# Patient Record
Sex: FEMALE | NOT HISPANIC OR LATINO | Employment: FULL TIME | URBAN - METROPOLITAN AREA
[De-identification: names, ages, dates, MRNs, and addresses within clinical notes are randomized per-mention and may not be internally consistent; named-entity substitution may affect disease eponyms.]

---

## 2019-03-11 ENCOUNTER — OFFICE VISIT CONVERTED (OUTPATIENT)
Dept: PLASTIC SURGERY | Facility: CLINIC | Age: 29
End: 2019-03-11
Attending: PLASTIC SURGERY

## 2019-04-04 ENCOUNTER — PROCEDURE VISIT CONVERTED (OUTPATIENT)
Dept: PLASTIC SURGERY | Facility: CLINIC | Age: 29
End: 2019-04-04
Attending: PLASTIC SURGERY

## 2019-04-04 ENCOUNTER — HOSPITAL ENCOUNTER (OUTPATIENT)
Dept: OTHER | Facility: HOSPITAL | Age: 29
Discharge: HOME | End: 2019-04-04
Attending: PLASTIC SURGERY

## 2019-04-11 ENCOUNTER — CONVERSION ENCOUNTER (OUTPATIENT)
Dept: PLASTIC SURGERY | Facility: CLINIC | Age: 29
End: 2019-04-11
Attending: SURGERY

## 2020-11-02 ENCOUNTER — HOSPITAL ENCOUNTER (OUTPATIENT)
Dept: ORTHOPEDIC SURGERY | Facility: CLINIC | Age: 30
Setting detail: RECURRING SERIES
Discharge: HOME | End: 2020-12-02
Attending: SURGERY

## 2021-03-04 ENCOUNTER — HOSPITAL ENCOUNTER (OUTPATIENT)
Dept: CARDIOLOGY | Facility: HOSPITAL | Age: 31
Discharge: HOME | End: 2021-03-04
Attending: NURSE PRACTITIONER

## 2021-03-26 ENCOUNTER — HOSPITAL ENCOUNTER (OUTPATIENT)
Dept: LAB | Facility: HOSPITAL | Age: 31
Discharge: HOME | End: 2021-03-26
Attending: INTERNAL MEDICINE

## 2021-03-26 LAB
ALBUMIN SERPL-MCNC: 4.5 G/DL (ref 3.5–5)
ALBUMIN/GLOB SERPL: 2 {RATIO} (ref 1.4–2.6)
ALP SERPL-CCNC: 90 U/L (ref 42–98)
ALT SERPL-CCNC: 29 U/L (ref 10–40)
ANION GAP SERPL CALC-SCNC: 18 MMOL/L (ref 8–19)
AST SERPL-CCNC: 20 U/L (ref 15–50)
BILIRUB SERPL-MCNC: 0.38 MG/DL (ref 0.2–1.3)
BUN SERPL-MCNC: 11 MG/DL (ref 5–25)
BUN/CREAT SERPL: 13 {RATIO} (ref 6–20)
CALCIUM SERPL-MCNC: 9.4 MG/DL (ref 8.7–10.4)
CHLORIDE SERPL-SCNC: 103 MMOL/L (ref 99–111)
CONV CO2: 21 MMOL/L (ref 22–32)
CONV TOTAL PROTEIN: 6.8 G/DL (ref 6.3–8.2)
CREAT UR-MCNC: 0.82 MG/DL (ref 0.5–0.9)
GFR SERPLBLD BASED ON 1.73 SQ M-ARVRAT: >60 ML/MIN/{1.73_M2}
GLOBULIN UR ELPH-MCNC: 2.3 G/DL (ref 2–3.5)
GLUCOSE SERPL-MCNC: 112 MG/DL (ref 65–99)
OSMOLALITY SERPL CALC.SUM OF ELEC: 286 MOSM/KG (ref 273–304)
POTASSIUM SERPL-SCNC: 4.3 MMOL/L (ref 3.5–5.3)
SODIUM SERPL-SCNC: 138 MMOL/L (ref 135–147)
T4 FREE SERPL-MCNC: 0.9 NG/DL (ref 0.9–1.8)
TSH SERPL-ACNC: 0.96 M[IU]/L (ref 0.27–4.2)

## 2021-05-15 VITALS
OXYGEN SATURATION: 97 % | BODY MASS INDEX: 50.02 KG/M2 | HEART RATE: 71 BPM | WEIGHT: 293 LBS | HEIGHT: 64 IN | DIASTOLIC BLOOD PRESSURE: 82 MMHG | SYSTOLIC BLOOD PRESSURE: 140 MMHG

## 2021-05-15 VITALS
BODY MASS INDEX: 50.02 KG/M2 | HEART RATE: 63 BPM | HEIGHT: 64 IN | WEIGHT: 293 LBS | DIASTOLIC BLOOD PRESSURE: 90 MMHG | SYSTOLIC BLOOD PRESSURE: 136 MMHG | OXYGEN SATURATION: 98 %

## 2021-12-17 ENCOUNTER — OFFICE VISIT (OUTPATIENT)
Dept: PULMONOLOGY | Facility: CLINIC | Age: 31
End: 2021-12-17

## 2021-12-17 VITALS
OXYGEN SATURATION: 99 % | BODY MASS INDEX: 39.78 KG/M2 | WEIGHT: 233 LBS | HEIGHT: 64 IN | RESPIRATION RATE: 16 BRPM | TEMPERATURE: 98.6 F

## 2021-12-17 DIAGNOSIS — E66.09 CLASS 2 OBESITY DUE TO EXCESS CALORIES WITH BODY MASS INDEX (BMI) OF 39.0 TO 39.9 IN ADULT, UNSPECIFIED WHETHER SERIOUS COMORBIDITY PRESENT: ICD-10-CM

## 2021-12-17 DIAGNOSIS — G47.33 OSA (OBSTRUCTIVE SLEEP APNEA): Primary | ICD-10-CM

## 2021-12-17 PROCEDURE — 99203 OFFICE O/P NEW LOW 30 MIN: CPT | Performed by: SPECIALIST

## 2021-12-17 RX ORDER — ALBUTEROL SULFATE 90 UG/1
2 AEROSOL, METERED RESPIRATORY (INHALATION)
COMMUNITY

## 2021-12-17 NOTE — PROGRESS NOTES
CONSULT NOTE     CHIEF COMPLAINT  Chief Complaint   Patient presents with   • Establish Care     New Patient- LUZ        Primary Care Provider  Aisha Crowell APRN     Referring Provider  Jefe Madden MD    Patient Complaint    ICD-10-CM ICD-9-CM   1. LUZ (obstructive sleep apnea)  G47.33 327.23   2. Class 2 obesity due to excess calories with body mass index (BMI) of 39.0 to 39.9 in adult, unspecified whether serious comorbidity present  E66.09 278.00    Z68.39 V85.39            Subjective          History of Presenting Illness  Graciela Salcido is a 31 y.o. female referred here for the further evaluation of the obstructive sleep apnea.  Patient had the sleep study done in 2016 and usually has CPAP for a while.  After that she stopped using it and she had a gastric bypass surgery done in May 2020 and lost about 100 pounds and being followed closely.  And she is still using the CPAP.  She was wondering whether she still needs the CPAP and if so she went ahead with the new CPAP as the present CPAP is not working very well.  Patient feels better not having an excellent daytime sleepiness.  She denies of having any fever, chills, sweating or any shortness of breath cough and other related.  No recent travel history or any exposure to anybody who is sick around.  Denied any loss of taste or smell.  Patient works as a  for the [employer].  Patient has 3 dogs in the house and never smoked and never had any PFT done or any x-rays.  Denied any chest pain, palpitation or any other related diaphoretic symptoms.      I have personally reviewed the review of systems, past family, social, medical and surgical histories; and agree with their findings.    HISTORY    Family History   Problem Relation Age of Onset   • Asthma Mother         Social History     Socioeconomic History   • Marital status:    Tobacco Use   • Smoking status: Never Smoker   • Smokeless tobacco: Never Used   Vaping Use   •  Vaping Use: Never used   Substance and Sexual Activity   • Alcohol use: No   • Drug use: No   • Sexual activity: Defer        Past Medical History:   Diagnosis Date   • Hypertension    • Migraine    • PCOS (polycystic ovarian syndrome)    • PTSD (post-traumatic stress disorder)    • Sleep apnea     cpap        Immunization History   Administered Date(s) Administered   • Flu Vaccine Quad PF >18YRS 10/29/2020   • Flu Vaccine Quad PF >36MO 11/22/2021       No Known Allergies       Current Outpatient Medications:   •  Calcium-Magnesium 100-50 MG tablet, Take 1 tablet by mouth Daily., Disp: , Rfl:   •  ibuprofen (ADVIL,MOTRIN) 800 MG tablet, Take 800 mg by mouth every 8 (eight) hours as needed for mild pain (1-3) (start after surgery)., Disp: , Rfl:   •  METFORMIN HCL PO, Take 500 mg by mouth Daily., Disp: , Rfl:   •  Prenatal Vit-DSS-Fe Cbn-FA (PRENATAL AD PO), Take  by mouth daily., Disp: , Rfl:   •  sertraline (ZOLOFT) 25 MG tablet, Take 50 mg by mouth Daily., Disp: , Rfl:   •  Vitamin D, Cholecalciferol, 1000 UNITS tablet, Take  by mouth daily., Disp: , Rfl:   •  albuterol sulfate  (90 Base) MCG/ACT inhaler, Inhale 2 puffs., Disp: , Rfl:   •  Chaste Tree (VITEX EXTRACT PO), Take 1,000 mg by mouth daily., Disp: , Rfl:   •  fluticasone-salmeterol (ADVAIR) 100-50 MCG/DOSE DISKUS, Inhale 1 puff., Disp: , Rfl:   •  HYDROcodone-acetaminophen (NORCO) 5-325 MG per tablet, Take 2 tablets by mouth every 4 (four) hours as needed (start after surgery)., Disp: , Rfl:   •  labetalol (NORMODYNE) 100 MG tablet, Take 200 mg by mouth 2 (two) times a day., Disp: , Rfl:   •  Omega-3 Fatty Acids (FISH OIL) 1000 MG capsule capsule, Take 1,000 mg by mouth daily with breakfast., Disp: , Rfl:   •  promethazine (PHENERGAN) 25 MG tablet, Take 25 mg by mouth every 6 (six) hours as needed for nausea or vomiting (start after surgery)., Disp: , Rfl:   •  vitamin E 100 UNIT capsule, Take 100 Units by mouth daily. Hasn't taken in one week,  Disp: , Rfl:      Smoking status: Never    Objective     Vital Signs:   Vitals:    12/17/21 1443   Resp: 16   Temp: 98.6 °F (37 °C)   SpO2: 99%        Physical Exam:  Very pleasant female alert and very include answering the questions appropriately.  HEENT: Asymmetric nasal septum with a narrowed left nasal cavity.  Mallampati class III airway.  High arched palate.  Overbite.  Neck: Supple, no JVD, no masses palpable.  Chest and lungs: Nontender and clear to auscultation.  Cardiovascular system: Regular rate and rhythm.  Abdomen: Soft and benign.  Extremities: No clubbing, no cyanosis, no edema.  Central nervous system is grossly intact.     Result Review :   None available to review           Impression:  Patient with a history of obstructive sleep apnea and exact details are unknown.  Patient had post gastric bypass surgery and lost about 100 pounds and wondering about persistence of the sleep apnea    Plan:  Discussed with the patient about the different options.  Continue to follow for the diet and exercise modifications as per the bariatric surgery recommendations.  We will proceed and get the home sleep study and depending upon the sleep study either discontinue the CPAP if not necessary are in the CPAP is present patient may need to have the new CPAP at the present 1 is not working.  Follow-up in 2 months.  If she has any problems between and if is an emergency go to the emergency room and inform pulmonary service.  Follow Up   Return in about 2 months (around 2/17/2022).  Patient was given instructions and counseling regarding her condition or for health maintenance advice. Please see specific information pulled into the AVS if appropriate.     Alfredo Reyna MD

## 2022-02-15 ENCOUNTER — HOSPITAL ENCOUNTER (OUTPATIENT)
Dept: SLEEP MEDICINE | Facility: HOSPITAL | Age: 32
Discharge: HOME | End: 2022-02-15
Admitting: SPECIALIST

## 2022-02-15 DIAGNOSIS — E66.09 CLASS 2 OBESITY DUE TO EXCESS CALORIES WITH BODY MASS INDEX (BMI) OF 39.0 TO 39.9 IN ADULT, UNSPECIFIED WHETHER SERIOUS COMORBIDITY PRESENT: ICD-10-CM

## 2022-02-15 DIAGNOSIS — G47.33 OSA (OBSTRUCTIVE SLEEP APNEA): ICD-10-CM

## 2022-02-15 PROCEDURE — 95806 SLEEP STUDY UNATT&RESP EFFT: CPT | Performed by: INTERNAL MEDICINE

## 2022-02-15 PROCEDURE — 95806 SLEEP STUDY UNATT&RESP EFFT: CPT

## 2022-03-23 ENCOUNTER — TELEPHONE (OUTPATIENT)
Dept: PULMONOLOGY | Facility: CLINIC | Age: 32
End: 2022-03-23

## 2022-03-23 NOTE — TELEPHONE ENCOUNTER
Patient called in stating that someone from our office called her stating the results of her sleep study are in. I could not find any documentation in the patient's chart of any one in our office calling her. Can you please check and see if her sleep study results are back. Thank you.

## 2022-03-23 NOTE — TELEPHONE ENCOUNTER
Patient is calling stating someone from our office called her with her sleep study results. I could not find any documentmention

## 2023-12-08 PROCEDURE — 87086 URINE CULTURE/COLONY COUNT: CPT | Performed by: NURSE PRACTITIONER

## 2025-01-28 ENCOUNTER — PREP FOR SURGERY (OUTPATIENT)
Dept: OTHER | Facility: HOSPITAL | Age: 35
End: 2025-01-28
Payer: COMMERCIAL

## 2025-01-28 DIAGNOSIS — D50.9 IRON DEFICIENCY ANEMIA, UNSPECIFIED IRON DEFICIENCY ANEMIA TYPE: Primary | ICD-10-CM
